# Patient Record
Sex: MALE | Race: WHITE | NOT HISPANIC OR LATINO | URBAN - METROPOLITAN AREA
[De-identification: names, ages, dates, MRNs, and addresses within clinical notes are randomized per-mention and may not be internally consistent; named-entity substitution may affect disease eponyms.]

---

## 2018-08-03 ENCOUNTER — INPATIENT (INPATIENT)
Facility: HOSPITAL | Age: 48
LOS: 0 days | Discharge: HOME | DRG: 603 | End: 2018-08-04
Attending: HOSPITALIST | Admitting: HOSPITALIST
Payer: COMMERCIAL

## 2018-08-03 VITALS
RESPIRATION RATE: 16 BRPM | DIASTOLIC BLOOD PRESSURE: 87 MMHG | WEIGHT: 229.94 LBS | TEMPERATURE: 98 F | SYSTOLIC BLOOD PRESSURE: 145 MMHG | OXYGEN SATURATION: 95 % | HEART RATE: 83 BPM

## 2018-08-03 DIAGNOSIS — L03.119 CELLULITIS OF UNSPECIFIED PART OF LIMB: ICD-10-CM

## 2018-08-03 LAB
ALBUMIN SERPL ELPH-MCNC: 3.5 G/DL — SIGNIFICANT CHANGE UP (ref 3.3–5)
ALP SERPL-CCNC: 77 U/L — SIGNIFICANT CHANGE UP (ref 40–120)
ALT FLD-CCNC: 77 U/L — SIGNIFICANT CHANGE UP (ref 12–78)
ANION GAP SERPL CALC-SCNC: 10 MMOL/L — SIGNIFICANT CHANGE UP (ref 5–17)
AST SERPL-CCNC: 36 U/L — SIGNIFICANT CHANGE UP (ref 15–37)
BASOPHILS # BLD AUTO: 0.03 K/UL — SIGNIFICANT CHANGE UP (ref 0–0.2)
BASOPHILS NFR BLD AUTO: 0.5 % — SIGNIFICANT CHANGE UP (ref 0–2)
BILIRUB SERPL-MCNC: 0.5 MG/DL — SIGNIFICANT CHANGE UP (ref 0.2–1.2)
BUN SERPL-MCNC: 16 MG/DL — SIGNIFICANT CHANGE UP (ref 7–23)
CALCIUM SERPL-MCNC: 9.1 MG/DL — SIGNIFICANT CHANGE UP (ref 8.5–10.1)
CHLORIDE SERPL-SCNC: 109 MMOL/L — HIGH (ref 96–108)
CO2 SERPL-SCNC: 23 MMOL/L — SIGNIFICANT CHANGE UP (ref 22–31)
CREAT SERPL-MCNC: 0.81 MG/DL — SIGNIFICANT CHANGE UP (ref 0.5–1.3)
EOSINOPHIL # BLD AUTO: 0.16 K/UL — SIGNIFICANT CHANGE UP (ref 0–0.5)
EOSINOPHIL NFR BLD AUTO: 2.4 % — SIGNIFICANT CHANGE UP (ref 0–6)
GLUCOSE SERPL-MCNC: 127 MG/DL — HIGH (ref 70–99)
HCT VFR BLD CALC: 42.1 % — SIGNIFICANT CHANGE UP (ref 39–50)
HGB BLD-MCNC: 15 G/DL — SIGNIFICANT CHANGE UP (ref 13–17)
IMM GRANULOCYTES NFR BLD AUTO: 0.3 % — SIGNIFICANT CHANGE UP (ref 0–1.5)
LYMPHOCYTES # BLD AUTO: 2.21 K/UL — SIGNIFICANT CHANGE UP (ref 1–3.3)
LYMPHOCYTES # BLD AUTO: 33.3 % — SIGNIFICANT CHANGE UP (ref 13–44)
MCHC RBC-ENTMCNC: 31.6 PG — SIGNIFICANT CHANGE UP (ref 27–34)
MCHC RBC-ENTMCNC: 35.6 GM/DL — SIGNIFICANT CHANGE UP (ref 32–36)
MCV RBC AUTO: 88.6 FL — SIGNIFICANT CHANGE UP (ref 80–100)
MONOCYTES # BLD AUTO: 0.59 K/UL — SIGNIFICANT CHANGE UP (ref 0–0.9)
MONOCYTES NFR BLD AUTO: 8.9 % — SIGNIFICANT CHANGE UP (ref 2–14)
NEUTROPHILS # BLD AUTO: 3.62 K/UL — SIGNIFICANT CHANGE UP (ref 1.8–7.4)
NEUTROPHILS NFR BLD AUTO: 54.6 % — SIGNIFICANT CHANGE UP (ref 43–77)
PLATELET # BLD AUTO: 152 K/UL — SIGNIFICANT CHANGE UP (ref 150–400)
POTASSIUM SERPL-MCNC: 3.9 MMOL/L — SIGNIFICANT CHANGE UP (ref 3.5–5.3)
POTASSIUM SERPL-SCNC: 3.9 MMOL/L — SIGNIFICANT CHANGE UP (ref 3.5–5.3)
PROT SERPL-MCNC: 7.7 G/DL — SIGNIFICANT CHANGE UP (ref 6–8.3)
RBC # BLD: 4.75 M/UL — SIGNIFICANT CHANGE UP (ref 4.2–5.8)
RBC # FLD: 12.1 % — SIGNIFICANT CHANGE UP (ref 10.3–14.5)
SODIUM SERPL-SCNC: 142 MMOL/L — SIGNIFICANT CHANGE UP (ref 135–145)
WBC # BLD: 6.63 K/UL — SIGNIFICANT CHANGE UP (ref 3.8–10.5)
WBC # FLD AUTO: 6.63 K/UL — SIGNIFICANT CHANGE UP (ref 3.8–10.5)

## 2018-08-03 PROCEDURE — 73130 X-RAY EXAM OF HAND: CPT | Mod: 26,LT

## 2018-08-03 PROCEDURE — 99284 EMERGENCY DEPT VISIT MOD MDM: CPT

## 2018-08-03 RX ORDER — SODIUM CHLORIDE 9 MG/ML
3 INJECTION INTRAMUSCULAR; INTRAVENOUS; SUBCUTANEOUS ONCE
Qty: 0 | Refills: 0 | Status: COMPLETED | OUTPATIENT
Start: 2018-08-03 | End: 2018-08-03

## 2018-08-03 RX ORDER — AMPICILLIN SODIUM AND SULBACTAM SODIUM 250; 125 MG/ML; MG/ML
3 INJECTION, POWDER, FOR SUSPENSION INTRAMUSCULAR; INTRAVENOUS ONCE
Qty: 0 | Refills: 0 | Status: COMPLETED | OUTPATIENT
Start: 2018-08-03 | End: 2018-08-03

## 2018-08-03 RX ORDER — KETOROLAC TROMETHAMINE 30 MG/ML
30 SYRINGE (ML) INJECTION ONCE
Qty: 0 | Refills: 0 | Status: DISCONTINUED | OUTPATIENT
Start: 2018-08-03 | End: 2018-08-03

## 2018-08-03 RX ORDER — VANCOMYCIN HCL 1 G
1000 VIAL (EA) INTRAVENOUS ONCE
Qty: 0 | Refills: 0 | Status: COMPLETED | OUTPATIENT
Start: 2018-08-03 | End: 2018-08-03

## 2018-08-03 RX ADMIN — SODIUM CHLORIDE 3 MILLILITER(S): 9 INJECTION INTRAMUSCULAR; INTRAVENOUS; SUBCUTANEOUS at 22:01

## 2018-08-03 RX ADMIN — Medication 30 MILLIGRAM(S): at 22:22

## 2018-08-03 RX ADMIN — AMPICILLIN SODIUM AND SULBACTAM SODIUM 3 GRAM(S): 250; 125 INJECTION, POWDER, FOR SUSPENSION INTRAMUSCULAR; INTRAVENOUS at 22:55

## 2018-08-03 RX ADMIN — Medication 250 MILLIGRAM(S): at 22:52

## 2018-08-03 RX ADMIN — AMPICILLIN SODIUM AND SULBACTAM SODIUM 200 GRAM(S): 250; 125 INJECTION, POWDER, FOR SUSPENSION INTRAMUSCULAR; INTRAVENOUS at 22:00

## 2018-08-03 RX ADMIN — Medication 30 MILLIGRAM(S): at 22:15

## 2018-08-03 NOTE — ED ADULT NURSE NOTE - OBJECTIVE STATEMENT
c/o left hand pain. c/o left hand pain.  3rd digit left hand with  wd with purulent drainage.   entire hand red +edema to wrist   pt states  drilled hand on  8/31   developed edema red yesterday,  getting progressively worse.,   denies fever/chills

## 2018-08-03 NOTE — ED ADULT NURSE NOTE - NSIMPLEMENTINTERV_GEN_ALL_ED
Implemented All Universal Safety Interventions:  [place] to call system. Call bell, personal items and telephone within reach. Instruct patient to call for assistance. Room bathroom lighting operational. Non-slip footwear when patient is off stretcher. Physically safe environment: no spills, clutter or unnecessary equipment. Stretcher in lowest position, wheels locked, appropriate side rails in place.

## 2018-08-03 NOTE — ED PROVIDER NOTE - OBJECTIVE STATEMENT
PT IS A 46YO MALE with no significant pmhx c/o finger swelling x today.    tetanus not up to date. PT IS A 48YO MALE with no significant pmhx c/o finger swelling x today. pt reports he put a drill but through his left hand 3rd digit a few days ago while at work. pt reports finger started swelling and pus started draining today. pt reports he took advil for pain which provided minimal relief. pt is right handed.   tetanus not up to date.

## 2018-08-03 NOTE — ED PROVIDER NOTE - SKIN, MLM
Skin normal color for race, warm, dry and intact. No evidence of rash. + erythema dorsum left hand 3rd digit with swelling and open wound with purulent drainage with erythema extending into hand and streak into left wrist.

## 2018-08-03 NOTE — ED ADULT NURSE REASSESSMENT NOTE - MUSCULOSKELETAL WDL
Full range of motion of upper and lower extremities, no joint tenderness/swelling. Swelling to 3rd digit of left hand

## 2018-08-03 NOTE — ED PROVIDER NOTE - PHYSICAL EXAMINATION
MS LE+3RD DIGIT WITH SWELLING AND DISCHARGE NOTED MS LE+3RD DIGIT WITH SWELLING AND DISCHARGE NOTED. unable to fully flex finger due to pain or swelling?. sensation grossly intact  cap refill < 2 seconds.

## 2018-08-03 NOTE — ED ADULT NURSE REASSESSMENT NOTE - NS ED NURSE REASSESS COMMENT FT1
Report taken from April, RN. Pt received alert and oriented x 4, no acute distress. Vancomycin infusing to 20 G right forearm. Awaiting bed.

## 2018-08-03 NOTE — ED PROVIDER NOTE - PROGRESS NOTE DETAILS
XRAY REVIEWED. FB NOTED IN [employer]. 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 WILL CONSULT HAND consulted dr francisco consulted dr francisco advised to give IV vanco and ancef. advised he will eval pt in tomorrow if admitted. will admit to medicine pending labs. [place]md [place] will admit to dr reyes. discussed with pt who agrees.

## 2018-08-03 NOTE — ED PROVIDER NOTE - ATTENDING CONTRIBUTION TO CARE
46YO MALE with no significant pmhx c/o finger swelling x today. pt reports he put a drill but through his left hand 3rd digit   vss, nontoxic L hand and 3rd digit swollen erythematous w/ purulent drainage  hand sx consulted, pt admitted for Iv abx,

## 2018-08-03 NOTE — ED ADULT NURSE REASSESSMENT NOTE - NSIMPLEMENTINTERV_GEN_ALL_ED
Implemented All Universal Safety Interventions:  [place] to call system. Call bell, personal items and telephone within reach. Instruct patient to call for assistance. Room bathroom lighting operational. Non-slip footwear when patient is off stretcher. Physically safe environment: no spills, clutter or unnecessary equipment. Stretcher in lowest position, wheels locked, appropriate side rails in place.

## 2018-08-04 VITALS
HEART RATE: 72 BPM | DIASTOLIC BLOOD PRESSURE: 82 MMHG | RESPIRATION RATE: 18 BRPM | OXYGEN SATURATION: 97 % | SYSTOLIC BLOOD PRESSURE: 144 MMHG | TEMPERATURE: 98 F

## 2018-08-04 DIAGNOSIS — I10 ESSENTIAL (PRIMARY) HYPERTENSION: ICD-10-CM

## 2018-08-04 DIAGNOSIS — Z29.9 ENCOUNTER FOR PROPHYLACTIC MEASURES, UNSPECIFIED: ICD-10-CM

## 2018-08-04 DIAGNOSIS — Z98.890 OTHER SPECIFIED POSTPROCEDURAL STATES: Chronic | ICD-10-CM

## 2018-08-04 DIAGNOSIS — L03.119 CELLULITIS OF UNSPECIFIED PART OF LIMB: ICD-10-CM

## 2018-08-04 DIAGNOSIS — R03.0 ELEVATED BLOOD-PRESSURE READING, WITHOUT DIAGNOSIS OF HYPERTENSION: ICD-10-CM

## 2018-08-04 PROCEDURE — 12345: CPT

## 2018-08-04 PROCEDURE — 99222 1ST HOSP IP/OBS MODERATE 55: CPT | Mod: GC,AI

## 2018-08-04 RX ORDER — CEPHALEXIN 500 MG
1 CAPSULE ORAL
Qty: 40 | Refills: 0 | OUTPATIENT
Start: 2018-08-04 | End: 2018-08-13

## 2018-08-04 RX ORDER — CEFAZOLIN SODIUM 1 G
3000 VIAL (EA) INJECTION EVERY 8 HOURS
Qty: 0 | Refills: 0 | Status: DISCONTINUED | OUTPATIENT
Start: 2018-08-04 | End: 2018-08-04

## 2018-08-04 RX ORDER — PIPERACILLIN AND TAZOBACTAM 4; .5 G/20ML; G/20ML
3.38 INJECTION, POWDER, LYOPHILIZED, FOR SOLUTION INTRAVENOUS EVERY 8 HOURS
Qty: 0 | Refills: 0 | Status: DISCONTINUED | OUTPATIENT
Start: 2018-08-04 | End: 2018-08-04

## 2018-08-04 RX ORDER — OXYCODONE AND ACETAMINOPHEN 5; 325 MG/1; MG/1
1 TABLET ORAL ONCE
Qty: 0 | Refills: 0 | Status: DISCONTINUED | OUTPATIENT
Start: 2018-08-04 | End: 2018-08-04

## 2018-08-04 RX ORDER — LACTOBACILLUS ACIDOPHILUS 100MM CELL
1 CAPSULE ORAL DAILY
Qty: 0 | Refills: 0 | Status: DISCONTINUED | OUTPATIENT
Start: 2018-08-04 | End: 2018-08-04

## 2018-08-04 RX ORDER — ACETAMINOPHEN 500 MG
650 TABLET ORAL EVERY 6 HOURS
Qty: 0 | Refills: 0 | Status: DISCONTINUED | OUTPATIENT
Start: 2018-08-04 | End: 2018-08-04

## 2018-08-04 RX ORDER — TETANUS AND DIPHTHERIA TOXOIDS ADSORBED 2; 2 [LF]/.5ML; [LF]/.5ML
0.5 INJECTION INTRAMUSCULAR ONCE
Qty: 0 | Refills: 0 | Status: COMPLETED | OUTPATIENT
Start: 2018-08-04 | End: 2018-08-04

## 2018-08-04 RX ORDER — CEFAZOLIN SODIUM 1 G
1000 VIAL (EA) INJECTION EVERY 8 HOURS
Qty: 0 | Refills: 0 | Status: DISCONTINUED | OUTPATIENT
Start: 2018-08-04 | End: 2018-08-04

## 2018-08-04 RX ORDER — LACTOBACILLUS ACIDOPHILUS 100MM CELL
0 CAPSULE ORAL
Qty: 0 | Refills: 0 | COMMUNITY
Start: 2018-08-04

## 2018-08-04 RX ADMIN — Medication 1 TABLET(S): at 12:55

## 2018-08-04 RX ADMIN — OXYCODONE AND ACETAMINOPHEN 1 TABLET(S): 5; 325 TABLET ORAL at 07:08

## 2018-08-04 RX ADMIN — TETANUS AND DIPHTHERIA TOXOIDS ADSORBED 0.5 MILLILITER(S): 2; 2 INJECTION INTRAMUSCULAR at 14:12

## 2018-08-04 RX ADMIN — Medication 100 MILLIGRAM(S): at 06:17

## 2018-08-04 RX ADMIN — PIPERACILLIN AND TAZOBACTAM 25 GRAM(S): 4; .5 INJECTION, POWDER, LYOPHILIZED, FOR SOLUTION INTRAVENOUS at 06:51

## 2018-08-04 RX ADMIN — Medication 650 MILLIGRAM(S): at 11:24

## 2018-08-04 NOTE — H&P ADULT - HISTORY OF PRESENT ILLNESS
The patient is a 46 yo male with remote history of kidney stones presenting with L hand swelling and a wound on his finger. He states that on Tuesday he accidentally drilled his finger with a power drill superficially (no through and through). On thurs day his middle finger began to well and then today he noted whole L hand swelling and pus drainage from the injury site. Today his pain is a 7/10.     ED Course:  Vitals: 145/87, 83 bpm, afebrile, RR 16  Labs: CBC wnl no white count, CMM wnl  XRAY - small radiodensities in distal 3rd and proximal 5th digit ( not at the site of injury)  vanc, unsyn, toradol given The patient is a right hand dominant 46 yo male with remote history of kidney stones presenting with L hand swelling and a wound on his finger. He states that on Tuesday he accidentally drilled his finger with a power drill superficially (no through and through). On thurs day his middle finger began to well and then today he noted whole L hand swelling and pus drainage from the injury site. Today his pain is a 7/10. Currently denies fever chills, NVD, CP or dyspnea.     ED Course:  Vitals: 145/87, 83 bpm, afebrile, RR 16  Labs: CBC wnl no white count, CMM wnl  XRAY - small radiodensities in distal 3rd and proximal 5th digit ( not at the site of injury)  vanc, unsyn, toradol given

## 2018-08-04 NOTE — DISCHARGE NOTE ADULT - PATIENT PORTAL LINK FT
You can access the [facility] Patient Portal, offered by [facility], by registering with the following website: [email or web address]

## 2018-08-04 NOTE — H&P ADULT - NSHPSOCIALHISTORY_GEN_ALL_CORE
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

## 2018-08-04 NOTE — H&P ADULT - PROBLEM SELECTOR PLAN 1
Admit to [facility]  Vitals: 145/87, 83 bpm, afebrile, RR 16  Labs: CBC wnl no white count, CMM wnl  XRAY - small radiodensities in distal 3rd and proximal 5th digit ( not at the site of injury)  IV Abx ancef, zosyn  Monitor for signs of infection in the setting of digital trauma  Hand Specialist consulted - Dr. Alexandre  regular diet and probiotic  ambulate as tolerated Admit to F  XRAY - small radiodensities in distal 3rd and proximal 5th digit ( not at the site of injury; pt attributes to old injury)  Hand-plastics consulted in ED recommending ABX (Dr Alexandre)   IV Abx ancef, zosyn  Monitor for signs of infection in the setting of digital trauma

## 2018-08-04 NOTE — DISCHARGE NOTE ADULT - PLAN OF CARE
Supportive care - Continue with antibiotics  - Should symptoms worsen, please consult orthopedist in [place]  - Should you develop fever, chills, worsening edema, worsening pain, numbness/tingling in finger, please consult your local ED immediately, do not wait if symptoms are worsening  - Keep hand elevated  - Continue other supportive measures including elevation of the hand

## 2018-08-04 NOTE — DISCHARGE NOTE ADULT - CARE PLAN
Principal Discharge DX:	Cellulitis of digit  Goal:	Supportive care  Assessment and plan of treatment:	- Continue with antibiotics  - Should symptoms worsen, please consult orthopedist in [place]  - Should you develop fever, chills, worsening edema, worsening pain, numbness/tingling in finger, please consult your local ED immediately, do not wait if symptoms are worsening  - Keep hand elevated  - Continue other supportive measures including elevation of the hand

## 2018-08-04 NOTE — H&P ADULT - NSHPPHYSICALEXAM_GEN_ALL_CORE
Physical Exam:  General: Well developed, well nourished, No Acute Distress  HEENT: Normocephallic Atraumatic, PERRLA, EOMI bl, moist mucous membranes   Neck: Supple, nontender, no mass  Neurology: AA&Ox3, CN II-XII grossly intact, sensation intact  Respiratory: Clear To Auscultation B/L, No Wheezes, rhonchi or rales  CV: Regular Rate and Rhythm, +S1/S2, no murmurs, rubs or gallops  Abdominal: Soft, Non-Tender, Non-Distended  Extremities: EDEMA OF LEFT HAND, DRAINING LESION 3MM LESION DORSAL ASPECT OF 3RD DIGIT ON THE PIP JOINT. No Clubbing, cyanosis or + peripheral pulses, no enlargement of axillary or epitrochlear lymph nodes  Musculoskeletal: REDUCED ROM OF L 3RD DIGIT IN FLEXION AND EXTENSION. Normal Range of motion otherwise, no joint erythema or warmth, no joint swelling   Skin: DRAINING/PUS LESION ON 3RD DIGIT OF L HAND

## 2018-08-04 NOTE — DISCHARGE NOTE ADULT - MEDICATION SUMMARY - MEDICATIONS TO TAKE
I will START or STAY ON the medications listed below when I get home from the hospital:    oxyCODONE-acetaminophen 5 mg-325 mg oral tablet  -- 1 tab(s) by mouth every 4 hours MDD:6  -- Caution federal law prohibits the transfer of this drug to any person other  than the person for whom it was prescribed.  May cause drowsiness.  Alcohol may intensify this effect.  Use care when operating dangerous machinery.  This prescription cannot be refilled.  This product contains acetaminophen.  Do not use  with any other product containing acetaminophen to prevent possible liver damage.  Using more of this medication than prescribed may cause serious breathing problems.    -- Indication: For As needed for pain    cephalexin 500 mg oral capsule  -- 1 cap(s) by mouth 4 times a day   -- Finish all this medication unless otherwise directed by prescriber.    -- Indication: For Cellulitis of digit    Augmentin 875 mg-125 mg oral tablet  -- 1 tab(s) by mouth every 12 hours   -- Finish all this medication unless otherwise directed by prescriber.  Take with food or milk.    -- Indication: For Cellulitis of digit    lactobacillus acidophilus oral capsule  --  by mouth (over the counter)  -- Indication: For Prophylactic measure

## 2018-08-04 NOTE — DISCHARGE NOTE ADULT - HOSPITAL COURSE
HPI:  The patient is a right hand dominant 46 yo male with remote history of kidney stones presenting with L hand swelling and a wound on his finger. He states that on Tuesday he accidentally drilled his finger with a power drill superficially (no through and through). On thurs day his middle finger began to well and then today he noted whole L hand swelling and pus drainage from the injury site. Today his pain is a 7/10. Currently denies fever chills, NVD, CP or dyspnea.     ED Course:  Vitals: 145/87, 83 bpm, afebrile, RR 16  Labs: CBC wnl no white count, CMM wnl  XRAY - small radiodensities in distal 3rd and proximal 5th digit ( not at the site of injury)  vanc, unsyn, toradol given (04 Aug 2018 00:57)      Patient was started on zosyn and ancef. Transitioned to oral antibiotics (keflex and augmentin). Pt's clinical symtpoms improved overnight. He will be discharged home and can follow up with his hand surgery/orthopedist in the next 3-5d. Pt has relationship with large orthopedic group in his hometown and his spouse is a healthcare professional, he has reliable follow up and is agreeable with the d/c and f/u plan. He was counseled extensively on symtpoms which may represent worsening clinical condition and was counseled to the return to the ED if these symptoms develop (most concerning symptoms detailed above). Pt's spouse agrees to monitor symptoms and will f/u appropriately. Tetanus immunization was given prior to d/c home.     The total amount of time spent reviewing the hospital notes, laboratory values, imaging findings, assessing/counseling the patient, discussing with consultant physicians, social work, nursing staff took 65 minutes.    T(C): 36.8 (08-04-18 @ 12:04), Max: 36.9 (08-04-18 @ 07:05)  HR: 72 (08-04-18 @ 12:04) (66 - 87)  BP: 144/82 (08-04-18 @ 12:04) (111/70 - 146/87)  RR: 18 (08-04-18 @ 12:04) (15 - 18)  SpO2: 97% (08-04-18 @ 12:04) (95% - 98%)    GENERAL: patient appears well, no acute distress, appropriate, pleasant  EYES: sclera clear, no exudates  ENMT: oropharynx clear without erythema, no exudates, moist mucous membranes  NECK: supple, soft, no thyromegaly noted  LUNGS: good air entry bilaterally, clear to auscultation, symmetric breath sounds, no wheezing or rhonchi appreciated  HEART: soft S1/S2, regular rate and rhythm, no murmurs noted, no lower extremity edema  GASTROINTESTINAL: abdomen is soft, nontender, nondistended, normoactive bowel sounds, no palpable masses  INTEGUMENT: good skin turgor, there is small open lesion draining clear/thin serous fluid, there is trace surrounding erythema  MUSCULOSKELETAL: no clubbing or cyanosis, no obvious deformity, there is full PROM of 3rd digit of L hand (albeit w/ discomfort) but without limitation, no edema appreciated in the wrist or hand  NEUROLOGIC: awake, alert, oriented x3, good muscle tone in 4 extremities, no obvious sensory deficits of L hand  PSYCHIATRIC: mood is good, affect is congruent, linear and logical thought process  HEME/LYMPH: no palpable supraclavicular nodules, no obvious ecchymosis or petechiae     Please see admit H+P for list of final dx

## 2018-08-04 NOTE — H&P ADULT - NSHPREVIEWOFSYSTEMS_GEN_ALL_CORE
Constitutional: Denies fever, chills, general malaise, weight loss, weight gain,  HEENT: Denies sore throat, runny nose blurry vision, vision   Respiratory: Denies shortness of breath, dyspnea on exertion, cough, sputum production, wheezing  Cardiovascular: EDEMA L HAND, Denies chest pain, palpitations  Gastrointestinal: Denies nausea, vomiting, diarrhea, constipation, abdominal pain  Genitourinary: Denies dysuria, hematuria, frequency, urgency, incontinence  Skin/Breast: Denies rash, hives, itching  Musculoskeletal: Denies muscle pains, muscle weakness, joint pain or swelling  Neurologic: Denies syncope, loss of consciousness, headache, weakness, dizziness, paresthesias, numbness, tingling, confusion  Heme/Onc: No enlarged lymph nodes  ROS negative except as noted above

## 2018-08-04 NOTE — H&P ADULT - ASSESSMENT
The patient is a 48 yo male with remote history of kidney stones presenting with L hand swelling and a wound on his finger.

## 2018-09-14 PROCEDURE — 96375 TX/PRO/DX INJ NEW DRUG ADDON: CPT

## 2018-09-14 PROCEDURE — 80053 COMPREHEN METABOLIC PANEL: CPT

## 2018-09-14 PROCEDURE — 36415 COLL VENOUS BLD VENIPUNCTURE: CPT

## 2018-09-14 PROCEDURE — 99285 EMERGENCY DEPT VISIT HI MDM: CPT | Mod: 25

## 2018-09-14 PROCEDURE — 96372 THER/PROPH/DIAG INJ SC/IM: CPT | Mod: XU

## 2018-09-14 PROCEDURE — 73130 X-RAY EXAM OF HAND: CPT

## 2018-09-14 PROCEDURE — 90714 TD VACC NO PRESV 7 YRS+ IM: CPT

## 2018-09-14 PROCEDURE — 85027 COMPLETE CBC AUTOMATED: CPT

## 2018-09-14 PROCEDURE — 96365 THER/PROPH/DIAG IV INF INIT: CPT

## 2022-04-25 NOTE — DISCHARGE NOTE ADULT - FUNCTIONAL SCREEN CURRENT LEVEL: BATHING, MLM
Patient here for dressing change. Removed melgisorb gauze kerlix and tetragrip. Per patient, JENNIFER Arboleda saw last week Thursday because he was having a reaction to 3M 2 Layer. Will update therapy plan to reflect.   
(0) independent

## 2022-12-27 NOTE — PATIENT PROFILE ADULT. - SOCIAL CONCERNS
Reason To Defer Override: size 2.6 Introduction Text (Please End With A Colon): The following procedure was deferred: X Size Of Lesion In Cm (Optional): 0 Detail Level: Zone None